# Patient Record
Sex: MALE | Race: BLACK OR AFRICAN AMERICAN | Employment: OTHER | URBAN - METROPOLITAN AREA
[De-identification: names, ages, dates, MRNs, and addresses within clinical notes are randomized per-mention and may not be internally consistent; named-entity substitution may affect disease eponyms.]

---

## 2022-05-19 ENCOUNTER — HOSPITAL ENCOUNTER (OUTPATIENT)
Age: 62
Discharge: HOME | End: 2022-05-21

## 2022-05-19 PROCEDURE — 88305 TISSUE EXAM BY PATHOLOGIST: CPT

## 2022-06-22 ENCOUNTER — TELEPHONE (OUTPATIENT)
Dept: CASE MANAGEMENT | Age: 62
End: 2022-06-22

## 2022-06-22 NOTE — TELEPHONE ENCOUNTER
Met with patient and wife during his initial consultation with Dr Rajinder Menjivar for his recent Prostate cancer diagnosis. Introduced myself and explained my role with patients receiving treatment at our center. Patient was friendly and receptive. They deny any questions or concerns at this time. Next steps include CT simulation and Radiation treatments per Dr Milagro Montalvo recommendations and follow up care. Provided patient with literature  on Patient Resource Prostate Cancer, [facility], Local Resources for Cancer Patients and community Transportation Resource List. Provided with my contact information and instructed patient to call me with questions or concerns. Verbalizes understanding. Patient appreciative of visit. Will continue to follow.

## 2022-07-29 ENCOUNTER — PREP FOR PROCEDURE (OUTPATIENT)
Dept: UROLOGY | Age: 62
End: 2022-07-29

## 2022-07-29 RX ORDER — SODIUM CHLORIDE 0.9 % (FLUSH) 0.9 %
5-40 SYRINGE (ML) INJECTION EVERY 12 HOURS SCHEDULED
Status: CANCELLED | OUTPATIENT
Start: 2022-07-29

## 2022-07-29 RX ORDER — SODIUM CHLORIDE 9 MG/ML
INJECTION, SOLUTION INTRAVENOUS CONTINUOUS
Status: CANCELLED | OUTPATIENT
Start: 2022-07-29

## 2022-07-29 RX ORDER — SODIUM CHLORIDE 9 MG/ML
INJECTION, SOLUTION INTRAVENOUS PRN
Status: CANCELLED | OUTPATIENT
Start: 2022-07-29

## 2022-07-29 RX ORDER — SODIUM CHLORIDE 0.9 % (FLUSH) 0.9 %
5-40 SYRINGE (ML) INJECTION PRN
Status: CANCELLED | OUTPATIENT
Start: 2022-07-29

## 2022-08-01 ENCOUNTER — ANESTHESIA EVENT (OUTPATIENT)
Dept: OPERATING ROOM | Age: 62
End: 2022-08-01
Payer: COMMERCIAL

## 2022-08-01 RX ORDER — MELOXICAM 15 MG/1
TABLET ORAL
COMMUNITY

## 2022-08-01 RX ORDER — PREDNISONE 1 MG/1
TABLET ORAL
COMMUNITY

## 2022-08-01 RX ORDER — HYDROXYCHLOROQUINE SULFATE 200 MG/1
TABLET, FILM COATED ORAL
COMMUNITY

## 2022-08-01 NOTE — PROGRESS NOTES
[facility] PRE-ADMISSION TESTING INSTRUCTIONS    The Preadmission Testing patient is instructed accordingly using the following criteria (check applicable):    ARRIVAL INSTRUCTIONS:  [x] Parking the day of Surgery is located in the Main Entrance lot. Upon entering the door, make an immediate right to the surgery reception desk    [x] Bring photo ID and insurance card    [] Bring in a copy of Living will or Durable Power of  papers. [x] Please be sure to arrange for responsible adult to provide transportation to and from the hospital    [x] Please arrange for responsible adult to be with you for the 24 hour period post procedure due to having anesthesia    [x] If you awake am of surgery not feeling well or have temperature >100 please call [phone]    GENERAL INSTRUCTIONS:    [x] Nothing by mouth after midnight, including gum, candy, mints or water    [x] You may brush your teeth, but do not swallow any water    [x] Take medications as instructed with 1-2 oz of water    [] Stop herbal supplements and vitamins 5 days prior to procedure    [] Follow preop dosing of blood thinners per physician instructions    [] Take 1/2 dose of evening insulin, but no insulin after midnight    [] No oral diabetic medications after midnight    [] If diabetic and have low blood sugar or feel symptomatic, take 1-2oz apple juice only    [] Bring inhalers day of surgery    [] Bring C-PAP/ Bi-Pap day of surgery    [] Bring urine specimen day of surgery    [x] Shower or bath with soap, lather and rinse well, AM of Surgery, no lotion, powders or creams   [] Follow bowel prep as instructed per surgeon    [x] No tobacco products within 24 hours of surgery     [x] No alcohol or illegal drug use within 24 hours of surgery.     [x] Jewelry, body piercing's, eyeglasses, contact lenses and dentures are not permitted into surgery (bring cases)      [] Please do not wear any nail polish, make up or hair products on the day of surgery    [x] You can expect a call the business day prior to procedure to notify you if your arrival time changes    [x] If you receive a survey after surgery we would greatly appreciate your comments    [] Parent/guardian of a minor must accompany their child and remain on the premises  the entire time they are under our care     [] Pediatric patients may bring favorite toy, blanket or comfort item with them    [] A caregiver or family member must remain with the patient during their stay if they are mentally handicapped, have dementia, disoriented or unable to use a call light or would be a safety concern if left unattended    [x] Please notify surgeon if you develop any illness between now and time of surgery (cold, cough, sore throat, fever, nausea, vomiting) or any signs of infections  including skin, wounds, and dental.    [x]  The Outpatient Pharmacy is available to fill your prescription here on your day of surgery, ask your preop nurse for details    [x] Other instructions: wear loose, comfortable clothing    EDUCATIONAL MATERIALS PROVIDED:    [] PAT Preoperative Education Packet/Booklet     [] Medication List    [] Transfusion bracelet applied with instructions    [] Shower with soap, lather and rinse well, and use CHG wipes provided the evening before surgery as instructed    [] Incentive spirometer with instructions

## 2022-08-02 ENCOUNTER — HOSPITAL ENCOUNTER (OUTPATIENT)
Age: 62
Setting detail: OUTPATIENT SURGERY
Discharge: HOME | End: 2022-08-02
Attending: UROLOGY | Admitting: UROLOGY
Payer: COMMERCIAL

## 2022-08-02 ENCOUNTER — ANESTHESIA (OUTPATIENT)
Dept: OPERATING ROOM | Age: 62
End: 2022-08-02
Payer: COMMERCIAL

## 2022-08-02 VITALS
HEIGHT: 72 IN | TEMPERATURE: 98.4 F | RESPIRATION RATE: 16 BRPM | WEIGHT: 182 LBS | OXYGEN SATURATION: 96 % | BODY MASS INDEX: 24.65 KG/M2 | DIASTOLIC BLOOD PRESSURE: 65 MMHG | HEART RATE: 82 BPM | SYSTOLIC BLOOD PRESSURE: 128 MMHG

## 2022-08-02 LAB — METER GLUCOSE: 89 MG/DL (ref 74–99)

## 2022-08-02 PROCEDURE — 6360000002 HC RX W HCPCS: Performed by: ANESTHESIOLOGIST ASSISTANT

## 2022-08-02 PROCEDURE — 2709999900 HC NON-CHARGEABLE SUPPLY: Performed by: UROLOGY

## 2022-08-02 PROCEDURE — 6360000002 HC RX W HCPCS: Performed by: NURSE PRACTITIONER

## 2022-08-02 PROCEDURE — 3700000000 HC ANESTHESIA ATTENDED CARE: Performed by: UROLOGY

## 2022-08-02 PROCEDURE — 82962 GLUCOSE BLOOD TEST: CPT

## 2022-08-02 PROCEDURE — 2580000003 HC RX 258: Performed by: NURSE PRACTITIONER

## 2022-08-02 PROCEDURE — C1889 IMPLANT/INSERT DEVICE, NOC: HCPCS | Performed by: UROLOGY

## 2022-08-02 PROCEDURE — 2580000003 HC RX 258: Performed by: UROLOGY

## 2022-08-02 PROCEDURE — 3700000001 HC ADD 15 MINUTES (ANESTHESIA): Performed by: UROLOGY

## 2022-08-02 PROCEDURE — 2500000003 HC RX 250 WO HCPCS: Performed by: UROLOGY

## 2022-08-02 PROCEDURE — 7100000010 HC PHASE II RECOVERY - FIRST 15 MIN: Performed by: UROLOGY

## 2022-08-02 PROCEDURE — 3600000002 HC SURGERY LEVEL 2 BASE: Performed by: UROLOGY

## 2022-08-02 PROCEDURE — 3600000012 HC SURGERY LEVEL 2 ADDTL 15MIN: Performed by: UROLOGY

## 2022-08-02 PROCEDURE — 7100000011 HC PHASE II RECOVERY - ADDTL 15 MIN: Performed by: UROLOGY

## 2022-08-02 DEVICE — SPACEOAR SYSTEMS
Type: IMPLANTABLE DEVICE | Site: PERINEUM | Status: FUNCTIONAL
Brand: SPACEOAR VUE™ SYSTEM - 10ML

## 2022-08-02 RX ORDER — SODIUM CHLORIDE 0.9 % (FLUSH) 0.9 %
5-40 SYRINGE (ML) INJECTION EVERY 12 HOURS SCHEDULED
Status: DISCONTINUED | OUTPATIENT
Start: 2022-08-02 | End: 2022-08-02 | Stop reason: HOSPADM

## 2022-08-02 RX ORDER — SODIUM CHLORIDE 9 MG/ML
INJECTION, SOLUTION INTRAVENOUS PRN
Status: DISCONTINUED | OUTPATIENT
Start: 2022-08-02 | End: 2022-08-02 | Stop reason: HOSPADM

## 2022-08-02 RX ORDER — PROPOFOL 10 MG/ML
INJECTION, EMULSION INTRAVENOUS PRN
Status: DISCONTINUED | OUTPATIENT
Start: 2022-08-02 | End: 2022-08-02 | Stop reason: SDUPTHER

## 2022-08-02 RX ORDER — BUPIVACAINE HYDROCHLORIDE 5 MG/ML
INJECTION, SOLUTION EPIDURAL; INTRACAUDAL PRN
Status: DISCONTINUED | OUTPATIENT
Start: 2022-08-02 | End: 2022-08-02 | Stop reason: ALTCHOICE

## 2022-08-02 RX ORDER — SODIUM CHLORIDE 0.9 % (FLUSH) 0.9 %
5-40 SYRINGE (ML) INJECTION PRN
Status: DISCONTINUED | OUTPATIENT
Start: 2022-08-02 | End: 2022-08-02 | Stop reason: HOSPADM

## 2022-08-02 RX ORDER — MIDAZOLAM HYDROCHLORIDE 1 MG/ML
INJECTION INTRAMUSCULAR; INTRAVENOUS PRN
Status: DISCONTINUED | OUTPATIENT
Start: 2022-08-02 | End: 2022-08-02 | Stop reason: SDUPTHER

## 2022-08-02 RX ORDER — IBUPROFEN 600 MG/1
600 TABLET ORAL EVERY 8 HOURS PRN
Status: DISCONTINUED | OUTPATIENT
Start: 2022-08-02 | End: 2022-08-02 | Stop reason: HOSPADM

## 2022-08-02 RX ORDER — FENTANYL CITRATE 50 UG/ML
INJECTION, SOLUTION INTRAMUSCULAR; INTRAVENOUS PRN
Status: DISCONTINUED | OUTPATIENT
Start: 2022-08-02 | End: 2022-08-02 | Stop reason: SDUPTHER

## 2022-08-02 RX ORDER — IBUPROFEN 600 MG/1
600 TABLET ORAL EVERY 8 HOURS PRN
Qty: 12 TABLET | Refills: 0 | Status: SHIPPED | OUTPATIENT
Start: 2022-08-02

## 2022-08-02 RX ORDER — TRAMADOL HYDROCHLORIDE 50 MG/1
100 TABLET ORAL PRN
Status: DISCONTINUED | OUTPATIENT
Start: 2022-08-02 | End: 2022-08-02 | Stop reason: HOSPADM

## 2022-08-02 RX ORDER — TRAMADOL HYDROCHLORIDE 50 MG/1
50 TABLET ORAL PRN
Status: DISCONTINUED | OUTPATIENT
Start: 2022-08-02 | End: 2022-08-02 | Stop reason: HOSPADM

## 2022-08-02 RX ORDER — ONDANSETRON 2 MG/ML
4 INJECTION INTRAMUSCULAR; INTRAVENOUS EVERY 6 HOURS PRN
Status: DISCONTINUED | OUTPATIENT
Start: 2022-08-02 | End: 2022-08-02 | Stop reason: HOSPADM

## 2022-08-02 RX ORDER — SODIUM CHLORIDE 9 MG/ML
INJECTION, SOLUTION INTRAVENOUS CONTINUOUS
Status: DISCONTINUED | OUTPATIENT
Start: 2022-08-02 | End: 2022-08-02 | Stop reason: HOSPADM

## 2022-08-02 RX ORDER — SODIUM CHLORIDE 0.9 % (FLUSH) 0.9 %
SYRINGE (ML) INJECTION PRN
Status: DISCONTINUED | OUTPATIENT
Start: 2022-08-02 | End: 2022-08-02 | Stop reason: ALTCHOICE

## 2022-08-02 RX ADMIN — PROPOFOL 50 MG: 10 INJECTION, EMULSION INTRAVENOUS at 15:17

## 2022-08-02 RX ADMIN — MIDAZOLAM 2 MG: 1 INJECTION INTRAMUSCULAR; INTRAVENOUS at 15:14

## 2022-08-02 RX ADMIN — SODIUM CHLORIDE: 9 INJECTION, SOLUTION INTRAVENOUS at 15:11

## 2022-08-02 RX ADMIN — FENTANYL CITRATE 100 MCG: 50 INJECTION, SOLUTION INTRAMUSCULAR; INTRAVENOUS at 15:14

## 2022-08-02 RX ADMIN — PROPOFOL 50 MG: 10 INJECTION, EMULSION INTRAVENOUS at 15:20

## 2022-08-02 RX ADMIN — PROPOFOL 50 MG: 10 INJECTION, EMULSION INTRAVENOUS at 15:24

## 2022-08-02 RX ADMIN — WATER 2000 MG: 1 INJECTION INTRAMUSCULAR; INTRAVENOUS; SUBCUTANEOUS at 15:18

## 2022-08-02 ASSESSMENT — PAIN - FUNCTIONAL ASSESSMENT
PAIN_FUNCTIONAL_ASSESSMENT: 0-10
PAIN_FUNCTIONAL_ASSESSMENT: ACTIVITIES ARE NOT PREVENTED

## 2022-08-02 ASSESSMENT — PAIN SCALES - GENERAL
PAINLEVEL_OUTOF10: 0

## 2022-08-02 ASSESSMENT — LIFESTYLE VARIABLES: SMOKING_STATUS: 1

## 2022-08-02 NOTE — ANESTHESIA PRE PROCEDURE
Department of Anesthesiology  Preprocedure Note       Name:  Timoteo Doherty   Age:  60 y.o.  :  1960                                          MRN:  [id number]         Date:  2022      Surgeon: Mario Retana):  Roddy Calloway MD    Procedure: Procedure(s):  ULTRASOUND GUIDED TRANSPERINEAL IMPLANT OF SPACEOAR    Medications prior to admission:   Prior to Admission medications    Medication Sig Start Date End Date Taking? Authorizing Provider   hydroxychloroquine (PLAQUENIL) 200 MG tablet TAKE 1 TABLET BY MOUTH TWICE A DAY    Historical Provider, MD   meloxicam (MOBIC) 15 MG tablet TAKE 1 TABLET BY MOUTH EVERY DAY    Historical Provider, MD   predniSONE (DELTASONE) 5 MG tablet TAKE 2 TABLETS BY MOUTH EVERY DAY    Historical Provider, MD   Tadalafil (CIALIS PO) prn    Historical Provider, MD       Current medications:    Current Facility-Administered Medications   Medication Dose Route Frequency Provider Last Rate Last Admin    0.9 % sodium chloride infusion   IntraVENous Continuous KANG Ugarte - CNP        sodium chloride flush 0.9 % injection 5-40 mL  5-40 mL IntraVENous 2 times per day KANG Ugarte - CNP        sodium chloride flush 0.9 % injection 5-40 mL  5-40 mL IntraVENous PRN Mercedes Mcclain APRN - CNP        0.9 % sodium chloride infusion   IntraVENous PRN KANG Ugarte - CNP        ceFAZolin (ANCEF) 2,000 mg in sterile water 20 mL IV syringe  2,000 mg IntraVENous On Call to [address], APRN - CNP           Allergies:  No Known Allergies    Problem List:  There is no problem list on file for this patient.       Past Medical History:        Diagnosis Date    Arthritis     Rheumatoid    Cancer [facility])     prostate       Past Surgical History:        Procedure Laterality Date    CARPAL TUNNEL RELEASE Bilateral     JOINT [facility]PLACEMENT      [facility]       Social History:    Social History     Tobacco Use    Smoking status: Every Day     Years: 10.00

## 2022-08-02 NOTE — DISCHARGE INSTRUCTIONS
Blood in the urine and stool is to be expected  Increase oral fluid intake for 2-3 days  Contact the radiation center this week to arrange tattoo marking and to begin radiation therapy  Call the [facility]  Urology office ([phone]) for a follow-up appointment a few weeks after completing radiation therapy  Resume a normal diet  No heavy lifting or physical activity for 1 day  OK to shower

## 2022-08-02 NOTE — H&P
Bone Scan CT Scan 6/13/22  BX: 5/19/22  TC: 4+3=7  PSA: 5/9/22-- 205.44  NO URINARY ISSUES AT THIS TIME        ALLERGIES: None     MEDICATIONS: Hydroxychloroquine Sulfate  Meloxicam  Prednisone         PSH: None     NON- PSH: Carpal Tunnel Surgery, Bilateral  Total Knee Replacement, Right            PMH: Malignant neoplasm of prostate - 6/2/2022  Elevated prostate specific antigen [PSA] - 5/12/2022        NON- PMH: None     FAMILY HISTORY: Hypertension - Mother  Strokes - Mother    Notes: FATHER PASSED FROM CAR ACCIDENT. MOTHER PASSED OF STROKE     SOCIAL HISTORY: Marital Status:   Preferred Language: English; Race: Black or African American  Current Smoking Status: Patient smokes. Smokes less than 1/2 pack per day. Tobacco Use Assessment Completed: Used Tobacco in last 30 days? Does not use smokeless tobacco.  Does not drink anymore. Does not use drugs. Drinks 4+ caffeinated drinks per day. Has not had a blood transfusion. Notes: RESERVE GRAD. RETIRED [employer]. WIFE ANDREW. DAUGHTER ONOCLOGY NURSE. REVIEW OF SYSTEMS:     Constitutional:   Patient denies fever, chills, and weight loss. Eyes:   Patient denies wearing glasses. Ears, Nose, Mouth, Throat:   Patient denies hearing loss. Cardiovascular:   Patient denies chest pains, swollen ankles, and irregular heartbeat. Respiratory:   Patient denies shortness of breath, wheezing, and chronic cough. Gastrointestinal:   Patient denies abdominal pain, nausea/vomiting, and change in bowels. Genitourinary:   Patient denies incontinence, painful urination, blood in urine, dobson catheter, and suprapubic catheter. Musculoskeletal:   Patient denies using cane, using walker, and using wheelchair. Integumentary/Skin:   Patient denies rash, persistent itching, and skin cancer history. Neurological:   Patient denies numbness, tingling, dizziness, and altered mental status.   Hematologic/Lymphatic:   Patient denies swollen glands, abnormal bleeding, and history blood transfusion. VITAL SIGNS:         Weight 180 lb / 81.65 kg  Height 72 in / 182.88 cm  Resp. Rate 18 /min  BMI 24.4 kg/m²     MULTI-SYSTEM PHYSICAL EXAMINATION:     Constitutional: Well-nourished. No physical deformities. Normally developed. Good grooming. Neck: Neck symmetrical, not swollen. Normal tracheal position. Respiratory: No labored breathing, no use of accessory muscles. Cardiovascular: Normal temperature, normal extremity pulses, no swelling, no varicosities. Skin: No paleness, no jaundice, no cyanosis. No lesion, no ulcer, no rash. Neurologic / Psychiatric: Oriented to time, oriented to place, oriented to person. No depression, no anxiety, no agitation. Eyes: Normal conjunctivae. Normal eyelids. Ears, Nose, Mouth, and Throat: Left ear no scars, no lesions, no masses. Right ear no scars, no lesions, no masses. Nose no scars, no lesions, no masses. Normal hearing. Normal lips. Musculoskeletal: Normal gait and station of head and neck. Complexity of Data:  Lab Test Review:   PSA  Records Review:   Pathology Reports, Previous Doctor Records, Previous Hospital Records  Urine Test Review:   Urinalysis  X-Ray Review: C.T. Abdomen/Pelvis: Reviewed Films. Reviewed Report. Discussed With Patient. Bone Scan: Reviewed Films. Reviewed Report. Discussed With Patient. 03/31/22 03/09/22  PSA  Total .5 ng/ml 205.44 ng/ml        PROCEDURES:             Eligard 45 mg / 6 Month - 97586,   Eligard was injected subcutaneously (SC) using standard technique. The patient tolerated the procedure well. A band aid was applied. The site was dry when the patient left the exam room. The patient will return as scheduled for his next Eligard injection. After treatment was administered, patient was observed to be symptom-free for 20 minutes. UIT:[id number]  HNK:     Qty: 1 Adm. By: Foreign Dimas MA  Unit: kit Lot No [id number]  Route: SQ Exp.  Date 2023-11-01  Freq: Q6M Mfgr.: Nicolasa  Site: Right Buttock     ASSESSMENT:      ICD-10 Details  1 :   Malignant neoplasm of prostate - C61       PLAN:   SpaceOAR     TC 4+3=7 PROSTATE CANCER IN HIGH VOLUME DISEASE WITH PSA AT DIAGNOSIS .5. CT / BONE SCAN WITHOUT OBVIOUS METASTATIC DISEASE OTHER THAN POSSIBLE TMJ AREA ON BONE SCAN. THIS DOES NOT APPEAR TO BE CONCERNING FOR CANCER BUT WILL FOLLOW-UP WITH CT SCAN TO BE SURE. TODAY ANDREW LU AND I DISCUSSED THIS DIAGNOSIS OF INTERMEDIATE, UNFAVORABLE PROSTATE CANCER. WHILE STAGING STUDIES REVEALED NO OBVIOUS METASTATIC DISEASE WE DISCUSSED THAT IT IS HIGHLY PROBABLE THAT TABITHA HAS MICROMETASTATIC DISEASE. I RECOMMENDED SURGERY + POSSIBLE ADJUVANT RADIOTHERAPY + HORMONE ABLATION. I DISCUSSED THIS WITH HIS DAUGHTER TONJA WHO IS AN ONCOLOGY NURSE.     HIS WIFE [address]lo[address] Alfaro THAT TABITHA WILL \"SPIRAL DOWNWARD\" IF HE HAS TO HAVE SURGERY. HE IS INTOLERANT OF INCONTINENCE OR ERECTILE DYSFUNCTION THAT ARE RISKS ASSOCIATED WITH SURGERY. BOTH TABITHA AND ANDREW UNDERSTAND THAT SURGERY IS THE MOST AGGRESSIVE TREATMENT FOR THIS CANCER. HE REFUSES. INSTEAD WE DISCUSSED COMBINATION HORMONAL + RADIATION THERAPY. WILL REFER TO RADIATION ONCOLOGY TO DISCUSS THIS FURTHER. WE DISCUSSED THE RISKS OF RADIATION INCLUDING RECTAL AND BLADDER TOXICITY. WE ALSO DISCUSSED THE USE OF SPACEOAR TODAY. I OUTLINED THE PROCESS OF SCHEDULING AND INPLANTING THIS PER DR. LANDON PIMENTEL. HE WILL MEET DR. Hyacinth Loredo PRIOR TO THE PROCEDURE. HE WILL UNDERGO PLACEMENT OF SPACEOAR TO DECREASE THE POTENTIAL MORBIDITY OF EXTERNAL BEAM RADIATION THERAPY. TODAY WE DISCUSSED THE [place] Began / Vel Deal / Staciean Bullocks. WE DISCUSSED THE POSSIBILITY OF INFUSION OF THIS MATERIAL INTO THE RECTUM TODAY AS A POSSIBLE RISK. THE PATIENT WAS GIVEN ALL OPTIONS REGARDING TREATMENT LOCATIONS FOR TREATMENT OF THIS CANCER. WE DISCUSSED ALL LOCAL SITES THAT PERFORM RADIATION THERAPY.  IN THE END THE PATIENT PREFERS TO HAVE HIS PROSTATE CANCER TREATED IN A CENTER THAT SPECIALIZES IN ONLY PROSTATE CANCER AND HE WAS REFERRED TO THIS CENTER. THEY ARE BOTH AWARE THAT HE WILL LIKELY REQUIRE TREATMENT IN THE FUTURE OF OTHER VARIETY INCLUDING ORAL ONCOLYTICS, I.V. CHEMOTHERAPY. THEY BOTH UNDERSTAND CLEARLY THE RISK OF DEATH FROM THIS CANCER.

## 2022-08-02 NOTE — H&P
Bone Scan CT Scan 6/13/22   BX: 5/19/22   TC: 4+3=7   PSA: 5/9/22-- 205.44   NO URINARY ISSUES AT THIS TIME        ALLERGIES: None     MEDICATIONS: Hydroxychloroquine Sulfate   Meloxicam   Prednisone         PSH: None     NON- PSH: Carpal Tunnel Surgery, Bilateral  Total Knee Replacement, Right          PMH: Malignant neoplasm of prostate - 6/2/2022  Elevated prostate specific antigen [PSA] - 5/12/2022       NON- PMH: None     FAMILY HISTORY: Hypertension - Mother  Strokes - Mother     Notes: FATHER PASSED FROM CAR ACCIDENT. MOTHER PASSED OF STROKE     SOCIAL HISTORY: Marital Status:   Preferred Language: English; Race: Black or African American  Current Smoking Status: Patient smokes. Smokes less than 1/2 pack per day. Tobacco Use Assessment Completed: Used Tobacco in last 30 days? Does not use smokeless tobacco.  Does not drink anymore. Does not use drugs. Drinks 4+ caffeinated drinks per day. Has not had a blood transfusion. Notes: RESERVE GRAD. RETIRED [employer]. WIFE ANDREW. DAUGHTER ONOCLOGY NURSE. REVIEW OF SYSTEMS:     Constitutional:   Patient denies fever, chills, and weight loss. Eyes:   Patient denies wearing glasses. Ears, Nose, Mouth, Throat:   Patient denies hearing loss. Cardiovascular:   Patient denies chest pains, swollen ankles, and irregular heartbeat. Respiratory:   Patient denies shortness of breath, wheezing, and chronic cough. Gastrointestinal:   Patient denies abdominal pain, nausea/vomiting, and change in bowels. Genitourinary:   Patient denies incontinence, painful urination, blood in urine, dobson catheter, and suprapubic catheter. Musculoskeletal:   Patient denies using cane, using walker, and using wheelchair. Integumentary/Skin:   Patient denies rash, persistent itching, and skin cancer history. Neurological:   Patient denies numbness, tingling, dizziness, and altered mental status.    Hematologic/Lymphatic:   Patient denies swollen glands, abnormal bleeding, and history blood transfusion. VITAL SIGNS:         Weight 180 lb / 81.65 kg   Height 72 in / 182.88 cm   Resp. Rate 18 /min   BMI 24.4 kg/m²     MULTI-SYSTEM PHYSICAL EXAMINATION:     Constitutional: Well-nourished. No physical deformities. Normally developed. Good grooming. Neck: Neck symmetrical, not swollen. Normal tracheal position. Respiratory: No labored breathing, no use of accessory muscles. Cardiovascular: Normal temperature, normal extremity pulses, no swelling, no varicosities. Skin: No paleness, no jaundice, no cyanosis. No lesion, no ulcer, no rash. Neurologic / Psychiatric: Oriented to time, oriented to place, oriented to person. No depression, no anxiety, no agitation. Eyes: Normal conjunctivae. Normal eyelids. Ears, Nose, Mouth, and Throat: Left ear no scars, no lesions, no masses. Right ear no scars, no lesions, no masses. Nose no scars, no lesions, no masses. Normal hearing. Normal lips. Musculoskeletal: Normal gait and station of head and neck. Complexity of Data:   Lab Test Review:   PSA   Records Review:   Pathology Reports, Previous Doctor Records, Previous Hospital Records   Urine Test Review:   Urinalysis   X-Ray Review: C.T. Abdomen/Pelvis: Reviewed Films. Reviewed Report. Discussed With Patient. Bone Scan: Reviewed Films. Reviewed Report. Discussed With Patient. 03/31/22 03/09/22   PSA   Total .5 ng/ml 205.44 ng/ml       PROCEDURES:            Eligard 45 mg / 6 Month - 32101,   Eligard was injected subcutaneously (SC) using standard technique. The patient tolerated the procedure well. A band aid was applied. The site was dry when the patient left the exam room. The patient will return as scheduled for his next Eligard injection. After treatment was administered, patient was observed to be symptom-free for 20 minutes. WEO:[id number]  FLE:     Qty: 1 Adm.  By: Caryn Jin MA   Unit: kit Lot No [id number]   Route: SQ Exp. Date 2023-11-01   Freq: Q6M Mfgr.: Nicolasa   Site: Right Buttock     ASSESSMENT:       ICD-10 Details   1 :   Malignant neoplasm of prostate - C61      PLAN:   SpaceOAR    TC 4+3=7 PROSTATE CANCER IN HIGH VOLUME DISEASE WITH PSA AT DIAGNOSIS .5. CT / BONE SCAN WITHOUT OBVIOUS METASTATIC DISEASE OTHER THAN POSSIBLE TMJ AREA ON BONE SCAN. THIS DOES NOT APPEAR TO BE CONCERNING FOR CANCER BUT WILL FOLLOW-UP WITH CT SCAN TO BE SURE. TODAY ANDREW LU AND I DISCUSSED THIS DIAGNOSIS OF INTERMEDIATE, UNFAVORABLE PROSTATE CANCER. WHILE STAGING STUDIES REVEALED NO OBVIOUS METASTATIC DISEASE WE DISCUSSED THAT IT IS HIGHLY PROBABLE THAT TABITHA HAS MICROMETASTATIC DISEASE. I RECOMMENDED SURGERY + POSSIBLE ADJUVANT RADIOTHERAPY + HORMONE ABLATION. I DISCUSSED THIS WITH HIS DAUGHTER TONJA WHO IS AN ONCOLOGY NURSE.     HIS WIFE Iglesia Alfaro THAT TABITHA WILL \"SPIRAL DOWNWARD\" IF HE HAS TO HAVE SURGERY. HE IS INTOLERANT OF INCONTINENCE OR ERECTILE DYSFUNCTION THAT ARE RISKS ASSOCIATED WITH SURGERY. BOTH TABITHA AND ANDREW UNDERSTAND THAT SURGERY IS THE MOST AGGRESSIVE TREATMENT FOR THIS CANCER. HE REFUSES. INSTEAD WE DISCUSSED COMBINATION HORMONAL + RADIATION THERAPY. WILL REFER TO RADIATION ONCOLOGY TO DISCUSS THIS FURTHER. WE DISCUSSED THE RISKS OF RADIATION INCLUDING RECTAL AND BLADDER TOXICITY. WE ALSO DISCUSSED THE USE OF SPACEOAR TODAY. I OUTLINED THE PROCESS OF SCHEDULING AND INPLANTING THIS PER DR. LANDON PIMENTEL. HE WILL MEET DR. Chuck Rahman PRIOR TO THE PROCEDURE. HE WILL UNDERGO PLACEMENT OF SPACEOAR TO DECREASE THE POTENTIAL MORBIDITY OF EXTERNAL BEAM RADIATION THERAPY. TODAY WE DISCUSSED THE Gretchen Janneth / Elicia Roman / Checo Barry. WE DISCUSSED THE POSSIBILITY OF INFUSION OF THIS MATERIAL INTO THE RECTUM TODAY AS A POSSIBLE RISK. THE PATIENT WAS GIVEN ALL OPTIONS REGARDING TREATMENT LOCATIONS FOR TREATMENT OF THIS CANCER.  WE DISCUSSED ALL LOCAL SITES THAT PERFORM

## 2022-08-02 NOTE — BRIEF OP NOTE
Brief Postoperative Note      Patient: Shiva Diallo  YOB: 1960  MRN: [id number]    Date of Procedure: 8/2/2022    Pre-Op Diagnosis: Prostate cancer ([address] Madelaine Glover    Post-Op Diagnosis: Same       Procedure(s):  ULTRASOUND GUIDED TRANSPERINEAL IMPLANT OF SPACEOAR    Surgeon(s):  Traci Humphries MD    Assistant:  * No surgical staff found *    Anesthesia: Monitor Anesthesia Care    Estimated Blood Loss (mL): Minimal    Complications: None    Specimens:   * No specimens in log *    Implants:  Implant Name Type Inv.  Item Serial No.  Lot No. LRB No. Used Action   SPACER RAD THER SPACEOAR DENICE - [id number]  SPACER RAD THER SPACEOAR DENICE  [employer] [id number] N/A 1 Implanted         Drains: * No LDAs found *    Findings: None    Electronically signed by Traci Humphries MD on 8/2/2022 at 3:41 PM

## 2022-08-02 NOTE — OP NOTE
[facility] [address]  Urology Post-operative Note      Rosas Delarosa  YOB: 1960  [id number]      Pre-operative Diagnosis: Localized prostate cancer     Post-operative Diagnosis:  Same     Procedure: Transperineal ultrasound-guided placement of periprostatic SpaceOAR DENICE hydrogel, intraoperative ultrasonic interpretation, rectal exam     Surgeon: Jessica Nevarez MD, FACS     Anesthesia:   [facility]     Estimated Blood Loss:   Minimal     Complications: None     Drains: None     Specimen(s): None     Clinical Note:   76-year-old male recently diagnosed with clinically localized prostate cancer. He has met with radiation oncology and is to start prostatic radiation therapy in the very near future. The risks and benefits of the procedure including but not limited to infection, bleeding, rectal or bladder injury, rectal pressure etc. were discussed in detail and he elected to proceed     Operative Description:   He was taken to the operating room and placed on the OR table in supine position. He received IV Ancef preoperatively. Following induction of anesthesia he was repositioned into the dorsolithotomy position. His scrotum was draped out of the operative field. Iodine paint was placed around the perineum and rectum. The stepper and a BK ultrasound probe were inserted rectally and fixed into place. Imaging of the prostate was performed in the transverse and longitudinal planes. This identified the periprostatic fat plane between the prostate and the rectum. About 5 cc of Marcaine was injected into the perineum. A 10 cm beveled 18-gauge needle was inserted transperineally with the bevel facing inferiorly. Ultrasound guidance allowed tracking of the needle placement into the periprosthetic fat plane near the base of the prostate gland. 8 cc's of saline was injected into this area for hydrodissection. The SpaceOAR DENICE kit was then opened on the back table sterilely.   The solutions were mixed appropriately and connected to the same needle which remained in place. The solution was injected completely and there was excellent placement confirmed by ultrasound guidance into the periprosthetic fat plane without rectal injury or involvement. The needle was removed. There was minimal bleeding. The ultrasound probe was removed. Rectal exam confirmed adequate placement of the hydrogel in the periprosthetic plane without involvement of the rectum. There is no active bleeding. He tolerated the procedure well and was taken to the PACU in stable condition. He was discharged home with  a prescription for ibuprofen.   He will follow-up as instructed and begin radiation soon       Joi Mckinnon MD  8/2/2022  3:41 PM

## 2022-08-02 NOTE — ANESTHESIA PRE PROCEDURE
Department of Anesthesiology  Preprocedure Note       Name:  Eben Gonzalez   Age:  60 y.o.  :  1960                                          MRN:  [id number]         Date:  2022      Surgeon: Swathi Mac):  Gracie Garcia MD    Procedure: Procedure(s):  ULTRASOUND GUIDED TRANSPERINEAL IMPLANT OF SPACEOAR    Medications prior to admission:   Prior to Admission medications    Medication Sig Start Date End Date Taking? Authorizing Provider   hydroxychloroquine (PLAQUENIL) 200 MG tablet TAKE 1 TABLET BY MOUTH TWICE A DAY    Historical Provider, MD   meloxicam (MOBIC) 15 MG tablet TAKE 1 TABLET BY MOUTH EVERY DAY    Historical Provider, MD   predniSONE (DELTASONE) 5 MG tablet TAKE 2 TABLETS BY MOUTH EVERY DAY    Historical Provider, MD   Tadalafil (CIALIS PO) prn    Historical Provider, MD       Current medications:    Current Facility-Administered Medications   Medication Dose Route Frequency Provider Last Rate Last Admin    0.9 % sodium chloride infusion   IntraVENous Continuous KANG Ugarte - CNP        sodium chloride flush 0.9 % injection 5-40 mL  5-40 mL IntraVENous 2 times per day Mercedes Mcclain APRN - CNP        sodium chloride flush 0.9 % injection 5-40 mL  5-40 mL IntraVENous PRN Mercedes Mcclain APRN - CNP        0.9 % sodium chloride infusion   IntraVENous PRN KANG Ugarte - CNP        ceFAZolin (ANCEF) 2,000 mg in sterile water 20 mL IV syringe  2,000 mg IntraVENous On Call to [address], APRN - CNP           Allergies:  No Known Allergies    Problem List:  There is no problem list on file for this patient.       Past Medical History:        Diagnosis Date    Arthritis     Rheumatoid    Cancer [facility])     prostate       Past Surgical History:        Procedure Laterality Date    CARPAL TUNNEL RELEASE Bilateral     JOINT REPLACEMENT      [facility]       Social History:    Social History     Tobacco Use    Smoking status: Every Day     Years: 10.00 Types: Cigarettes    Smokeless tobacco: Former     Types: Chew   Substance Use Topics    Alcohol use: Not Currently                                Ready to quit: Not Answered  Counseling given: Not Answered      Vital Signs (Current):   Vitals:    08/01/22 0920   Weight: 182 lb (82.6 kg)   Height: 6' (1.829 m)                                              BP Readings from Last 3 Encounters:   No data found for BP       NPO Status:                                                                                 BMI:   Wt Readings from Last 3 Encounters:   08/01/22 182 lb (82.6 kg)     Body mass index is 24.68 kg/m². CBC: No results found for: WBC, RBC, HGB, HCT, MCV, RDW, PLT    CMP: No results found for: NA, K, CL, CO2, BUN, CREATININE, GFRAA, AGRATIO, LABGLOM, GLUCOSE, GLU, PROT, CALCIUM, BILITOT, ALKPHOS, AST, ALT    POC Tests: No results for input(s): POCGLU, POCNA, POCK, POCCL, POCBUN, POCHEMO, POCHCT in the last 72 hours. Coags: No results found for: PROTIME, INR, APTT    HCG (If Applicable): No results found for: PREGTESTUR, PREGSERUM, HCG, HCGQUANT     ABGs: No results found for: PHART, PO2ART, NXQ6LYK, OYW4AOA, BEART, Y9TOIMJJ     Type & Screen (If Applicable):  No results found for: LABABO, LABRH    Drug/Infectious Status (If Applicable):  No results found for: HIV, HEPCAB    COVID-19 Screening (If Applicable): No results found for: COVID19        Anesthesia Evaluation  Patient summary reviewed no history of anesthetic complications:   Airway: Mallampati: I  TM distance: >3 FB   Neck ROM: full     Dental:          Pulmonary: breath sounds clear to auscultation  (+) current smoker          Patient smoked on day of surgery.                  Cardiovascular:Negative CV ROS            Rhythm: regular  Rate: normal           Beta Blocker:  Not on Beta Blocker         Neuro/Psych:   Negative Neuro/Psych ROS              GI/Hepatic/Renal: Neg GI/Hepatic/Renal ROS            Endo/Other:    (+) : arthritis: rheumatoid. , malignancy/cancer (prostate). Abdominal:             Vascular: negative vascular ROS. Other Findings:           Anesthesia Plan      MAC     ASA 2       Induction: intravenous. Anesthetic plan and risks discussed with patient. Plan discussed with CRNA.                     Aleah Aquino MD   8/2/2022

## 2022-08-04 NOTE — ANESTHESIA POSTPROCEDURE EVALUATION
Department of Anesthesiology  Postprocedure Note    Patient: Graeme Mtz  MRN: [id number]  YOB: 1960  Date of evaluation: 8/4/2022      Procedure Summary     Date: 08/02/22 Room / Location: [facility] OR 04 / [facility]    Anesthesia Start: 2057 Anesthesia Stop: 1    Procedure: ULTRASOUND GUIDED TRANSPERINEAL IMPLANT OF SPACEOAR (Perineum) Diagnosis:       Prostate cancer ([address]      (Prostate cancer ([address] Elpidio Kanner)    Surgeons: Sin Carmichael MD Responsible Provider: Herb Dubon MD    Anesthesia Type: MAC ASA Status: 2          Anesthesia Type: MAC    Fidencio Phase I: Fidencio Score: 10    Fidencio Phase II: Fidencio Score: 10      Anesthesia Post Evaluation    Patient location during evaluation: PACU  Patient participation: complete - patient participated  Level of consciousness: awake and alert  Airway patency: patent  Nausea & Vomiting: no nausea and no vomiting  Complications: no  Cardiovascular status: hemodynamically stable  Respiratory status: acceptable  Hydration status: euvolemic  There was medical reason for not using a multimodal analgesia pain management approach.

## 2023-01-25 PROBLEM — C61 PROSTATE CANCER (HCC): Status: ACTIVE | Noted: 2022-05-19

## 2023-02-09 ENCOUNTER — TELEPHONE (OUTPATIENT)
Dept: CASE MANAGEMENT | Age: 63
End: 2023-02-09

## 2023-02-09 NOTE — TELEPHONE ENCOUNTER
Met with patient after his follow up appointment with Dr Savannah Luna today. Patient completed his active treatment November 2022 for his Prostate Cancer. Patient appears in good spirits. States he is doing good since finishing his Radiation treatments. He denies any questions, concerns or needs at this time. Provided support and encouragement. Instructed patient in detail on his Cancer Treatment Summary and Survivorship Care Plan. Copies sent to patient's PCP and Urologist. Provided written copies of Treatment Summary/Survivorship Care Plan, Patient Resource: Cancer Survivorship: A Guide for Patients and Their Families booklet and [facility] [employer] handout. Patient will continue to follow with Urology. Encouraged to call with questions, concerns or needs. Verbally agreed. Patient appreciative of visit and information.

## 2023-11-14 ENCOUNTER — HOSPITAL ENCOUNTER (OUTPATIENT)
Age: 63
Discharge: HOME | End: 2023-11-16
Payer: OTHER GOVERNMENT

## 2023-11-14 ENCOUNTER — HOSPITAL ENCOUNTER (OUTPATIENT)
Dept: GENERAL RADIOLOGY | Age: 63
Discharge: HOME | End: 2023-11-16
Payer: OTHER GOVERNMENT

## 2023-11-14 DIAGNOSIS — M13.80 ALLERGIC ARTHRITIS: ICD-10-CM

## 2023-11-14 PROCEDURE — 73562 X-RAY EXAM OF KNEE 3: CPT

## 2023-12-14 ENCOUNTER — HOSPITAL ENCOUNTER (OUTPATIENT)
Dept: RADIATION ONCOLOGY | Age: 63
Discharge: HOME | End: 2023-12-14
Payer: COMMERCIAL

## 2023-12-14 ENCOUNTER — TELEPHONE (OUTPATIENT)
Dept: RADIATION ONCOLOGY | Age: 63
End: 2023-12-14

## 2023-12-14 VITALS
WEIGHT: 167.8 LBS | RESPIRATION RATE: 18 BRPM | SYSTOLIC BLOOD PRESSURE: 132 MMHG | DIASTOLIC BLOOD PRESSURE: 78 MMHG | BODY MASS INDEX: 22.76 KG/M2 | TEMPERATURE: 97.6 F | OXYGEN SATURATION: 97 % | HEART RATE: 74 BPM

## 2023-12-14 DIAGNOSIS — C79.51 SECONDARY CANCER OF BONE (HCC): Primary | ICD-10-CM

## 2023-12-14 DIAGNOSIS — C61 PROSTATE CANCER (HCC): Primary | ICD-10-CM

## 2023-12-14 PROCEDURE — 99245 OFF/OP CONSLTJ NEW/EST HI 55: CPT | Performed by: RADIOLOGY

## 2023-12-14 PROCEDURE — 99205 OFFICE O/P NEW HI 60 MIN: CPT

## 2023-12-14 PROCEDURE — 77470 SPECIAL RADIATION TREATMENT: CPT | Performed by: RADIOLOGY

## 2023-12-14 RX ORDER — MULTIVIT-MIN/IRON/FOLIC ACID/K 18-600-40
CAPSULE ORAL
COMMUNITY

## 2023-12-14 NOTE — PROGRESS NOTES
Radiation Oncology      Lawrnce Jewels. Roxane Robertson  [facility]      Referring Physician: Dr. Jenni Reddy      Primary Care Physician:Mary Grace Delgado MD   Primary Oncologist: Dr. Naima Shankar. [place]      Diagnosis: NCCN high risk prostate cancer s/p RT/ADT, ongoing ADT   -RT completed 10/21/22   -most recent PSA 6/29/23 0.15      Service:  Radiation Oncology consultation performed on 12/14/23        HPI:        Irene Thacker is a well appearing 58year old with oligomets. Patient as a history of prostate cancer, adenocarcinoma radha 4+3=7 grade 3. Patient received radiation therapy to his prostate [facility] 9/8/22-10/21/22 28fx/7000cGy by Dr. Lilibeth Aragon. Patient also is receiving hormone therapy last one was given 9/20/23 that is given every 6 months. Patient latest PSA was done on 6/29/23 it was 0.15, 12/8/22 0.43, 3/31/22 226.5, 3/19/22 205.44. Patient went for a PSMA PET on 11/3/23 that showed focus of tracer uptake in right scapular spine with faint sclerosis maximum SUV 3.5, Left axillary node with preserved fatty hilum 0.8 cm  maximum SUV 2.4, likely reactive. Low level subcarinal and bilateral hilar tracer uptake without measurable lesion, likely reactive. Right level IV node 0.5cm SUV 2.4 in salivary glands. Prostate no focus of abnormal tracer uptake. Patient was referred here by Dr. Sujata Madera who recommends SBRT to the right scapula. The patient presents today to discuss fractionated external beam radiation therapy as a component of multidisciplinary, definative (lesion) management. We reviewed the available medical records including the complete medical history of this pt today prior to consultation. Epic -CE and available scanned documents per the Epic Media tab were reviewed PRN. A complete ROS was also performed today and is noted below.   During consultation today I personally discussed the pts workup to date; including but not

## 2023-12-14 NOTE — TELEPHONE ENCOUNTER
Nellie Soyelizabeth  12/14/2023  Ht Readings from Last 1 Encounters:   08/02/22 1.829 m (6')     Wt Readings from Last 10 Encounters:   12/14/23 76.1 kg (167 lb 12.8 oz)   08/02/22 82.6 kg (182 lb)     BMI=22.76    Assessment: Met with Jaya and his wife while in for radiation consult with Dr. Kd Hernandez to discuss radiation to right scapula. Casper Berkowitz reports that his usual body weight is 170# and he has been without significant weight change. Reports eating breakfast and supper and picks for lunch. Discussed increased metabolic needs and weight maintenance while receiving therapy. Encouraged him to add 2 high calorie/protein snacks during the day due to the length of time he goes between breakfast and supper. Stressed importance of consistent protein intake for energy. Provided \"High Calorie/Protein Snack Ideas\". Nutrition questions answered to apparent satisfaction. Contact information provided for questions or concerns.        Holley Aguilar RD

## 2023-12-15 NOTE — ADDENDUM NOTE
Encounter addended by: Addie Spear RN on: 12/15/2023 10:33 AM   Actions taken: Charge Capture section accepted Return for pain, fever not resolving with motrin, shortness of breath, vomiting, decreased fluid intake, weakness, numbness, dizziness, or any change or concerns. Use imodium over the counter as directed. Diarrhea: Care Instructions  Your Care Instructions    Diarrhea is loose, watery stools (bowel movements). The exact cause is often hard to find. Sometimes diarrhea is your body's way of getting rid of what caused an upset stomach. Viruses, food poisoning, and many medicines can cause diarrhea. Some people get diarrhea in response to emotional stress, anxiety, or certain foods. Almost everyone has diarrhea now and then. It usually isn't serious, and your stools will return to normal soon. The important thing to do is replace the fluids you have lost, so you can prevent dehydration. The doctor has checked you carefully, but problems can develop later. If you notice any problems or new symptoms, get medical treatment right away. Follow-up care is a key part of your treatment and safety. Be sure to make and go to all appointments, and call your doctor if you are having problems. It's also a good idea to know your test results and keep a list of the medicines you take. How can you care for yourself at home? · Watch for signs of dehydration, which means your body has lost too much water. Dehydration is a serious condition and should be treated right away. Signs of dehydration are:  ¨ Increasing thirst and dry eyes and mouth. ¨ Feeling faint or lightheaded. ¨ Darker urine, and a smaller amount of urine than normal.  · To prevent dehydration, drink plenty of fluids, enough so that your urine is light yellow or clear like water. Choose water and other caffeine-free clear liquids until you feel better. If you have kidney, heart, or liver disease and have to limit fluids, talk with your doctor before you increase the amount of fluids you drink.   · Begin eating small amounts of mild foods the next day, if you feel like it. ¨ Try yogurt that has live cultures of Lactobacillus. (Check the label.)  ¨ Avoid spicy foods, fruits, alcohol, and caffeine until 48 hours after all symptoms are gone. ¨ Avoid chewing gum that contains sorbitol. ¨ Avoid dairy products (except for yogurt with Lactobacillus) while you have diarrhea and for 3 days after symptoms are gone. · The doctor may recommend that you take over-the-counter medicine, such as loperamide (Imodium), if you still have diarrhea after 6 hours. Read and follow all instructions on the label. Do not use this medicine if you have bloody diarrhea, a high fever, or other signs of serious illness. Call your doctor if you think you are having a problem with your medicine. When should you call for help? Call 911 anytime you think you may need emergency care. For example, call if:  ? · You passed out (lost consciousness). ? · Your stools are maroon or very bloody. ?Call your doctor now or seek immediate medical care if:  ? · You are dizzy or lightheaded, or you feel like you may faint. ? · Your stools are black and look like tar, or they have streaks of blood. ? · You have new or worse belly pain. ? · You have symptoms of dehydration, such as:  ¨ Dry eyes and a dry mouth. ¨ Passing only a little dark urine. ¨ Feeling thirstier than usual.   ? · You have a new or higher fever. ? Watch closely for changes in your health, and be sure to contact your doctor if:  ? · Your diarrhea is getting worse. ? · You see pus in the diarrhea. ? · You are not getting better after 2 days (48 hours). Where can you learn more? Go to http://marcial-karmen.info/. Enter A471 in the search box to learn more about \"Diarrhea: Care Instructions. \"  Current as of: March 20, 2017  Content Version: 11.4  © 7667-8301 "[employer]. Care instructions adapted under license by [employer] (which disclaims liability or warranty for this information).  If you have questions about a medical condition or this instruction, always ask your healthcare professional. [address] any warranty or liability for your use of this information. Viral Infections: Care Instructions  Your Care Instructions    You don't feel well, but it's not clear what's causing it. You may have a viral infection. Viruses cause many illnesses, such as the common cold, influenza, fever, rashes, and the diarrhea, nausea, and vomiting that are often called \"stomach flu. \" You may wonder if antibiotic medicines could make you feel better. But antibiotics only treat infections caused by bacteria. They don't work on viruses. The good news is that viral infections usually aren't serious. Most will go away in a few days without medical treatment. In the meantime, there are a few things you can do to make yourself more comfortable. Follow-up care is a key part of your treatment and safety. Be sure to make and go to all appointments, and call your doctor if you are having problems. It's also a good idea to know your test results and keep a list of the medicines you take. How can you care for yourself at home? · Get plenty of rest if you feel tired. · Take an over-the-counter pain medicine if needed, such as acetaminophen (Tylenol), ibuprofen (Advil, Motrin), or naproxen (Aleve). Read and follow all instructions on the label. · Be careful when taking over-the-counter cold or flu medicines and Tylenol at the same time. Many of these medicines have acetaminophen, which is Tylenol. Read the labels to make sure that you are not taking more than the recommended dose. Too much acetaminophen (Tylenol) can be harmful. · Drink plenty of fluids, enough so that your urine is light yellow or clear like water. If you have kidney, heart, or liver disease and have to limit fluids, talk with your doctor before you increase the amount of fluids you drink.   · Stay home from work, school, and other public places while you have a fever. When should you call for help? Call 911 anytime you think you may need emergency care. For example, call if:  ? · You have severe trouble breathing. ? · You passed out (lost consciousness). ?Call your doctor now or seek immediate medical care if:  ? · You seem to be getting much sicker. ? · You have a new or higher fever. ? · You have blood in your stools. ? · You have new belly pain, or your pain gets worse. ? · You have a new rash. ? Watch closely for changes in your health, and be sure to contact your doctor if:  ? · You start to get better and then get worse. ? · You do not get better as expected. Where can you learn more? Go to http://marcial-karmen.info/. Enter M125 in the search box to learn more about \"Viral Infections: Care Instructions. \"  Current as of: March 3, 2017  Content Version: 11.4  © 1760-8791 [employer]. Care instructions adapted under license by [employer] (which disclaims liability or warranty for this information). If you have questions about a medical condition or this instruction, always ask your healthcare professional. [address] any warranty or liability for your use of this information.

## 2024-01-04 ENCOUNTER — HOSPITAL ENCOUNTER (OUTPATIENT)
Dept: RADIATION ONCOLOGY | Age: 64
Discharge: HOME | End: 2024-01-04
Payer: COMMERCIAL

## 2024-01-04 PROCEDURE — 77301 RADIOTHERAPY DOSE PLAN IMRT: CPT | Performed by: RADIOLOGY

## 2024-01-04 PROCEDURE — 77338 DESIGN MLC DEVICE FOR IMRT: CPT | Performed by: RADIOLOGY

## 2024-01-04 PROCEDURE — 77293 RESPIRATOR MOTION MGMT SIMUL: CPT | Performed by: RADIOLOGY

## 2024-01-04 PROCEDURE — 77300 RADIATION THERAPY DOSE PLAN: CPT | Performed by: RADIOLOGY

## 2024-01-15 ENCOUNTER — APPOINTMENT (OUTPATIENT)
Dept: RADIATION ONCOLOGY | Age: 64
End: 2024-01-15
Payer: COMMERCIAL

## 2024-01-15 DIAGNOSIS — C79.51 SECONDARY CANCER OF BONE (HCC): Primary | ICD-10-CM

## 2024-01-15 PROCEDURE — 77373 STRTCTC BDY RAD THER TX DLVR: CPT | Performed by: RADIOLOGY

## 2024-01-15 PROCEDURE — NBSRV NON-BILLABLE SERVICE: Performed by: RADIOLOGY

## 2024-01-15 NOTE — PATIENT INSTRUCTIONS
Continue daily fractionated radiation therapy as scheduled. Please see weekly OTV note and intial consultation letter in EPIC for clinical details.        David Cristobal III, MD MS DABR    [place]:  [phone]   FAX: [phone]  [facility]:  [phone]   FAX:    [phone]  [place]:  [phone]   FAX:  [phone]  Email: [email or web address]

## 2024-01-15 NOTE — PROGRESS NOTES
Radiation Oncology          Mr.Ronnie Nice underwent fractionated external beam radiation therapy using a SBRT / SABR technique.    I was personally present for the treatment planning (+/- 4D CT, W/WO Ab compression) imaging and pt setup to ensure appropriate immobilization to meet current LADAN standards. After a detailed review of the treatment plan and appropriate physics QA / oversight this pt was scheduled and underwent hypo fractionated treatment as noted per the D/I in Mosaiq.  Typical fractionation schemes include but are not limited to 3000 Gy in 3-5 fractions.  The NCCN guidelines and pt workup including a detailed discussion of the risks, benefits and alternative were discussed previously [RTOG dose constraints met per plan as applicable- see Mosaiq].  The pt verbalized understanding for the risks of this procedure today prior to Tx.   Today, after a dual identification time out (including a brief plan overview )- I personally reviewed the complex multifaceted immobilization apparatus W/WO compression +/- Synergy (PRN), patient position, and 4D imaging (if applicable) prior to the treatment delivery to ensure accuracy.  The SBRT team, including myself, were all present for the set up CT scan and delivery of the fraction (the latter on a PRN basis).  The patient successfully completed the procedure today in stable condition; this procedure was well tolerated today.         Jaya Nice has now received 1000 cGy in 1/3 fractions directed to the bone.        David Cristobal III, MD MS [facility]  Radiation Oncology  Cell: [phone]    [place]:  [phone]   FAX: [phone]  [facility]:  [phone]   FAX:    [phone]  [facility]:  [phone]   FAX:  [phone]

## 2024-01-16 ENCOUNTER — APPOINTMENT (OUTPATIENT)
Dept: RADIATION ONCOLOGY | Age: 64
End: 2024-01-16
Payer: COMMERCIAL

## 2024-01-17 ENCOUNTER — APPOINTMENT (OUTPATIENT)
Dept: RADIATION ONCOLOGY | Age: 64
End: 2024-01-17
Payer: COMMERCIAL

## 2024-01-17 DIAGNOSIS — C79.51 SECONDARY CANCER OF BONE (HCC): Primary | ICD-10-CM

## 2024-01-17 DIAGNOSIS — C61 PROSTATE CANCER (HCC): ICD-10-CM

## 2024-01-17 PROCEDURE — 77373 STRTCTC BDY RAD THER TX DLVR: CPT | Performed by: RADIOLOGY

## 2024-01-17 PROCEDURE — NBSRV NON-BILLABLE SERVICE: Performed by: RADIOLOGY

## 2024-01-17 NOTE — ADDENDUM NOTE
Encounter addended by: Shelia Contreras RN on: 1/17/2024 11:42 AM   Actions taken: Clinical Note Signed

## 2024-01-17 NOTE — PROGRESS NOTES
Radiation Oncology          Mr.Ronnie Nice underwent fractionated external beam radiation therapy using a SBRT / SABR technique.    I was personally present for the treatment planning (+/- 4D CT, W/WO Ab compression) imaging and pt setup to ensure appropriate immobilization to meet current LADAN standards. After a detailed review of the treatment plan and appropriate physics QA / oversight this pt was scheduled and underwent hypo fractionated treatment as noted per the D/I in [place]q.  Typical fractionation schemes include but are not limited to 3000 Gy in 3-5 fractions.  The NCCN guidelines and pt workup including a detailed discussion of the risks, benefits and alternative were discussed previously [RTOG dose constraints met per plan as applicable- see Mosaiq].  The pt verbalized understanding for the risks of this procedure today prior to Tx.   Today, after a dual identification time out (including a brief plan overview )- I personally reviewed the complex multifaceted immobilization apparatus W/WO compression +/- Synergy (PRN), patient position, and 4D imaging (if applicable) prior to the treatment delivery to ensure accuracy.  The SBRT team, including myself, were all present for the set up CT scan and delivery of the fraction (the latter on a PRN basis).  The patient successfully completed the procedure today in stable condition; this procedure was well tolerated today.         Jaya Nice has now received 2000 cGy in 2/3 fractions directed to the R scap.        David Cristobal III, MD MS [facility]  Radiation Oncology  Cell: [phone]    [place]:  [phone]   FAX: [phone]  [facility]:  [phone]   FAX:    [phone]  [facility]:  [phone]   FAX:  [phone]               8

## 2024-01-17 NOTE — PATIENT INSTRUCTIONS
Continue daily fractionated radiation therapy as scheduled. Please see weekly OTV note and intial consultation letter in EPIC for clinical details.        David Cristobal III, MD MS DABR    [place]:  [phone]   FAX: [phone]  [facility]:  [phone]   FAX:    [phone]  [place]:  [phone]   FAX:  [phone]  Email: [email or web address]

## 2024-01-17 NOTE — PROGRESS NOTES
Jaya Nice  1/17/2024  Wt Readings from Last 3 Encounters:   12/14/23 76.1 kg (167 lb 12.8 oz)   08/02/22 82.6 kg (182 lb)     There is no height or weight on file to calculate BMI.        Treatment Area:ITV r scapula    Patient was seen today for weekly visit.     Comfort Alteration  KPS:90%  Fatigue: None      Nutritional Alteration  Anorexia: No   Nausea: No   Vomiting: No     Elimination Alterations  Constipation: no  Diarrhea:  no  Bowel Incontinence: No  Urinary Incontinence: No    Skin Alteration   Sensation:no    Radiation Dermatitis:  no      Emotional  Coping: effective    Sexuality Alteration  na    Injury, potential bleeding or infection: no        No results found for: \"WBC\", \"HGB\", \"HCT\", \"PLT\"    There were no vitals taken for this visit.  BP within normal range? yes       Assessment/Plan: Pt completed 2/3fx and 2000/3000cGy.    Shelia Contreras RN

## 2024-01-17 NOTE — PROGRESS NOTES
DEPARTMENT OF RADIATION ONCOLOGY ON TREATMENT VISIT         1/17/2024      NAME:  Jaya Nice    YOB: 1960    Diagnosis: bone met - SBRT    SUBJECTIVE:   Jaya Nice has now received SBRT ongoing 2/3.    Past medical, surgical, social and family histories reviewed and updated as indicated.    Pain: controlled    ALLERGIES:  Patient has no known allergies.         Current Outpatient Medications   Medication Sig Dispense Refill    Cholecalciferol (VITAMIN D) 50 MCG (2000 UT) CAPS capsule Take by mouth      vitamin D (CHOLECALCIFEROL) 25 MCG (1000 UT) TABS tablet Take 1 tablet by mouth daily      hydroxychloroquine (PLAQUENIL) 200 MG tablet TAKE 1 TABLET BY MOUTH TWICE A DAY      meloxicam (MOBIC) 15 MG tablet TAKE 1 TABLET BY MOUTH EVERY DAY      predniSONE (DELTASONE) 5 MG tablet TAKE 2 TABLETS BY MOUTH EVERY DAY       No current facility-administered medications for this encounter.           OBJECTIVE:  Alert and fully ambulatory. Pleasant and conversant.      Physical Examination: General appearance - alert, well appearing, and in no distress.          Wt Readings from Last 3 Encounters:   12/14/23 76.1 kg (167 lb 12.8 oz)   08/02/22 82.6 kg (182 lb)         ASSESSMENT/PLAN:     Patient is tolerating treatments well with expected toxicities. RBA were reviewed prior to first fraction and PRN.    Current and planned dose reviewed. Goals of treatment and potential side effects were reviewed with the patient PRN. Treatment imaging has been personally reviewed for accuracy and precision.    Questions answered to apparent satisfaction.    Treatments will continue as planned.      David Cristobal III, MD MS DABR  Radiation Oncologist        [facility] ([facility]): [phone] /// FAX: [phone]  [facility] [phone] /// FAX: [phone]  [facility]): [phone] /// FAX: [phone]

## 2024-01-19 ENCOUNTER — APPOINTMENT (OUTPATIENT)
Dept: RADIATION ONCOLOGY | Age: 64
End: 2024-01-19
Payer: COMMERCIAL

## 2024-01-19 DIAGNOSIS — C79.51 SECONDARY CANCER OF BONE (HCC): Primary | ICD-10-CM

## 2024-01-19 PROCEDURE — 77336 RADIATION PHYSICS CONSULT: CPT | Performed by: RADIOLOGY

## 2024-01-19 PROCEDURE — 77373 STRTCTC BDY RAD THER TX DLVR: CPT | Performed by: RADIOLOGY

## 2024-01-19 NOTE — PROGRESS NOTES
Radiation Oncology          Mr.Ronnie Nice underwent fractionated external beam radiation therapy using a SBRT / SABR technique.    I was personally present for the treatment planning (+/- 4D CT, W/WO Ab compression) imaging and pt setup to ensure appropriate immobilization to meet current [facility] standards. After a detailed review of the treatment plan and appropriate physics QA / oversight this pt was scheduled and underwent hypo fractionated treatment as noted per the D/I in [facility].  Typical fractionation schemes include but are not limited to 3000 Gy in 3-5 fractions.  The NCCN guidelines and pt workup including a detailed discussion of the risks, benefits and alternative were discussed previously [RTOG dose constraints met per plan as applicable- see [place]].  The pt verbalized understanding for the risks of this procedure today prior to Tx.   Today, after a dual identification time out (including a brief plan overview )- I personally reviewed the complex multifaceted immobilization apparatus W/WO compression +/- Synergy (PRN), patient position, and 4D imaging (if applicable) prior to the treatment delivery to ensure accuracy.  The SBRT team, including myself, were all present for the set up CT scan and delivery of the fraction (the latter on a PRN basis).  The patient successfully completed the procedure today in stable condition; this procedure was well tolerated today.         Jaya Nice has now received 3000 cGy in 3/3 fractions directed to the R scap.        David Cristobal III, MD [facility]  Radiation Oncology  Cell: [phone]    [facility]:  [phone]   FAX: [phone]  [facility]:  [phone]   FAX:    [phone]  [facility]:  [phone]   FAX:  [phone]

## 2024-01-19 NOTE — PATIENT INSTRUCTIONS
Continue daily fractionated radiation therapy as scheduled. Please see weekly OTV note and intial consultation letter in EPIC for clinical details.        David Cristobal III, MD MS DABR    [facility]:  [phone]   FAX: [phone]  [facility]:  [phone]   FAX:    [phone]  [facility]:  [phone]   FAX:  [phone]  Email: [email or web address]

## (undated) DEVICE — MARKER,SKIN,WI/RULER AND LABELS: Brand: MEDLINE

## (undated) DEVICE — STANDARD HYPODERMIC NEEDLE,POLYPROPYLENE HUB: Brand: MONOJECT

## (undated) DEVICE — SYRINGE, LUER LOCK, 10ML: Brand: MEDLINE

## (undated) DEVICE — KIT SURG PREP POVIDONE IOD PRESATURATED PAINT WET FOR UNIV

## (undated) DEVICE — GLOVE ORANGE PI 7 1/2   MSG9075

## (undated) DEVICE — DRAPE,REIN 53X77,STERILE: Brand: MEDLINE

## (undated) DEVICE — COVER PRB L11.9CM TAPR L3.8X61CM TRNSPAR SFT PLIABLE

## (undated) DEVICE — GAUZE,SPONGE,4"X4",8PLY,STRL,LF,10/TRAY: Brand: MEDLINE

## (undated) DEVICE — TOWEL,OR,DSP,ST,BLUE,STD,6/PK,12PK/CS: Brand: MEDLINE

## (undated) DEVICE — GEL US 20GM NONIRRITATING OVERWRAPPED FILE PCH TRNSMIT

## (undated) DEVICE — GOWN,SIRUS,FABRNF,XL,20/CS: Brand: MEDLINE

## (undated) DEVICE — MEDICINE CUP, GRADUATED, STER: Brand: MEDLINE